# Patient Record
(demographics unavailable — no encounter records)

---

## 2025-02-20 NOTE — HISTORY OF PRESENT ILLNESS
[de-identified] : 41 yo /ho DNS and Rhinitis Now present w/ hearing loss ear itch and poss wax in ears no other modifying factors no other nasal or throat complaints [FreeTextEntry1] : 2/20/2025 Intermittent right sided vertigo performs home exercises for tx of this Recent noted flickering in Right ear--uses q tips no change in hearing [Hearing Loss] : hearing loss [Otalgia] : otalgia [Nasal Congestion] : no nasal congestion [Ear Fullness] : ear fullness [Neck Mass] : no neck mass [Heat Intolerance] : no heat intolerance

## 2025-02-20 NOTE — ASSESSMENT
[FreeTextEntry1] : Right Vestib weakness Rec VRT  Right Ear flicker-poss wax that has already fell out pt reassured  f/u prn

## 2025-05-30 NOTE — REASON FOR VISIT
[Initial Consultation] : an initial consultation for [Spouse] : spouse [FreeTextEntry2] : Papillary thyroid malignancy

## 2025-05-30 NOTE — ASSESSMENT
[FreeTextEntry1] : recommended total thyroidectomy, modified radical neck dissection.  risks, benefits and alternatives discussed at length.  I have discussed with the patient the anatomy of the area, the pathophysiology of the disease process and the rationale for surgery.  The attendant risks, possible complications and expected postoperative course have been discussed in detail.  I have given the patient the opportunity to ask questions, and all questions have been answered to the patient's satisfaction, and they wish to proceed with the planned procedure.  to be scheduled inpatient at [facility] with recurrent nerve monitoring.  will need to stop Mounjaro 2 weeks preop. recommended considering genetic testing since mother was also treated for thyroid carcinoma.

## 2025-05-30 NOTE — HISTORY OF PRESENT ILLNESS
[de-identified] : Pt c/o thyroid nodule and abnormal lymph node found on sonogram. denies dysphagia, hoarseness, SOB or RT exposure. mother treated for thyroid carcinoma sonogram: Left 1.3 cm thyroid nodule and Left level 3 1.9 cm abnormal lymph node FNA: cervical lymph node positive for papillary thyroid cancer normal TFTs, calcium 9.2 I have reviewed all old and new data and available images.  Additional information was obtained from others present at the time of visit to ensure the completeness of the history

## 2025-05-30 NOTE — CONSULT LETTER
[Dear  ___] : Dear  [unfilled], [Consult Letter:] : I had the pleasure of evaluating your patient, [unfilled]. [Please see my note below.] : Please see my note below. [Consult Closing:] : Thank you very much for allowing me to participate in the care of this patient.  If you have any questions, please do not hesitate to contact me. [Sincerely,] : Sincerely, [FreeTextEntry2] : Dr. Nik Gardner, Dr. Martin Benoit [FreeTextEntry3] : Adrian Guevara MD, FACS System Director, Endocrine Surgery [facility] Associate  Professor of Surgery [employer] at [facility] [DrJenn  ___] : Dr. AMADOR

## 2025-05-30 NOTE — ASSESSMENT
[FreeTextEntry1] : recommended total thyroidectomy, modified radical neck dissection.  risks, benefits and alternatives discussed at length.  I have discussed with the patient the anatomy of the area, the pathophysiology of the disease process and the rationale for surgery.  The attendant risks, possible complications and expected postoperative course have been discussed in detail.  I have given the patient the opportunity to ask questions, and all questions have been answered to the patient's satisfaction, and they wish to proceed with the planned procedure.  to be scheduled inpatient at [facility] with recurrent nerve monitoring.  will need to stop Mounjaro 2 weeks preop. recommended considering genetic testing since mother was also treated for thyroid carcinoma.

## 2025-05-30 NOTE — PHYSICAL EXAM
[de-identified] : 1.2 cm left upper pole thyroid nodule, well circumscribed and mobile [Laryngoscopy Performed] : laryngoscopy was performed, see procedure section for findings [Midline] : located in midline position [Normal] : orientation to person, place, and time: normal [de-identified] : node not palpable [de-identified] : indirect laryngoscopy shows normal vocal cord mobility bilaterally with no lesions noted

## 2025-05-30 NOTE — HISTORY OF PRESENT ILLNESS
[de-identified] : Pt c/o thyroid nodule and abnormal lymph node found on sonogram. denies dysphagia, hoarseness, SOB or RT exposure. mother treated for thyroid carcinoma sonogram: Left 1.3 cm thyroid nodule and Left level 3 1.9 cm abnormal lymph node FNA: cervical lymph node positive for papillary thyroid cancer normal TFTs, calcium 9.2 I have reviewed all old and new data and available images.  Additional information was obtained from others present at the time of visit to ensure the completeness of the history

## 2025-05-30 NOTE — CONSULT LETTER
[Dear  ___] : Dear  [unfilled], [Consult Letter:] : I had the pleasure of evaluating your patient, [unfilled]. [Please see my note below.] : Please see my note below. [Consult Closing:] : Thank you very much for allowing me to participate in the care of this patient.  If you have any questions, please do not hesitate to contact me. [Sincerely,] : Sincerely, [FreeTextEntry2] : Dr. Nik Gardner, Dr. Martin Benoit [FreeTextEntry3] : Adrian Guevara MD, FACS System Director, Endocrine Surgery [facility] Associate  Professor of Surgery [facility] [employer] at [facility] [DrJenn  ___] : Dr. AMADOR

## 2025-07-10 NOTE — ASSESSMENT
[FreeTextEntry1] : s/p Total Thyroidectomy and neck dissection daily care drain removed call for path f/u Dr Gardner f/u 1 month

## 2025-07-10 NOTE — HISTORY OF PRESENT ILLNESS
[de-identified] : Pt 3 days s/p total thyroidectomy and neck dissection doing well without complaints drain less than 30 cc